# Patient Record
Sex: FEMALE | Race: WHITE
[De-identification: names, ages, dates, MRNs, and addresses within clinical notes are randomized per-mention and may not be internally consistent; named-entity substitution may affect disease eponyms.]

---

## 2018-07-13 ENCOUNTER — HOSPITAL ENCOUNTER (OUTPATIENT)
Dept: HOSPITAL 72 - ULS | Age: 78
Discharge: HOME | End: 2018-07-13
Attending: INTERNAL MEDICINE
Payer: MEDICARE

## 2018-07-13 DIAGNOSIS — I70.0: ICD-10-CM

## 2018-07-13 DIAGNOSIS — K76.89: Primary | ICD-10-CM

## 2018-07-13 PROCEDURE — 76705 ECHO EXAM OF ABDOMEN: CPT

## 2018-07-13 NOTE — DIAGNOSTIC IMAGING REPORT
Indication: Abdominal pain. Persists.

 

Technique: US ABD limited

 

Comparison: 11/3/2010; CT abdomen 7/17/2016

 

Findings: Please note that patient requested limited evaluation as she could not

tolerate the pressure of the probe.

 

Imaged portions of the pancreatic head grossly unremarkable. The body and tail are

not seen. Liver contour appears smooth. Hepatic echogenicity appears within normal

limits and homogeneous. Imaged hepatic veins appear patent. Liver is normal in size.

Partially visualized is grossly unremarkable. In the inferior right hepatic lobe

there is a 1.8 x 2.2 x 2.1 cm well-circumscribed anechoic structure consistent with

the cyst noted in this region on prior CT. No additional hepatic lesion is

identified. Abdominal aorta is normal in caliber with some scattered atherosclerotic

calcifications.

 

IMPRESSION: 

Limited abdominal ultrasound as patient could not tolerate probe pressure. Only

images of the liver, aorta and pancreatic head were obtained.

*  Simple appearing cyst in the inferior right hepatic lobe measuring up to 2.6 cm in

diameter. This corresponds with the cyst noted in this region on prior CT.

*  Atherosclerotic disease noted in the aorta. Visualized portions of the abdominal

aorta normal in caliber.

## 2019-01-29 ENCOUNTER — HOSPITAL ENCOUNTER (EMERGENCY)
Dept: HOSPITAL 72 - EMR | Age: 79
Discharge: HOME | End: 2019-01-29
Payer: MEDICARE

## 2019-01-29 VITALS — DIASTOLIC BLOOD PRESSURE: 86 MMHG | SYSTOLIC BLOOD PRESSURE: 152 MMHG

## 2019-01-29 VITALS — DIASTOLIC BLOOD PRESSURE: 85 MMHG | SYSTOLIC BLOOD PRESSURE: 166 MMHG

## 2019-01-29 VITALS — BODY MASS INDEX: 21.71 KG/M2 | WEIGHT: 118 LBS | HEIGHT: 62 IN

## 2019-01-29 VITALS — SYSTOLIC BLOOD PRESSURE: 192 MMHG | DIASTOLIC BLOOD PRESSURE: 94 MMHG

## 2019-01-29 VITALS — SYSTOLIC BLOOD PRESSURE: 145 MMHG | DIASTOLIC BLOOD PRESSURE: 80 MMHG

## 2019-01-29 DIAGNOSIS — D25.9: Primary | ICD-10-CM

## 2019-01-29 DIAGNOSIS — Z88.8: ICD-10-CM

## 2019-01-29 DIAGNOSIS — I10: ICD-10-CM

## 2019-01-29 LAB
ADD MANUAL DIFF: NO
ALBUMIN SERPL-MCNC: 3.7 G/DL (ref 3.4–5)
ALBUMIN/GLOB SERPL: 1 {RATIO} (ref 1–2.7)
ALP SERPL-CCNC: 113 U/L (ref 46–116)
ALT SERPL-CCNC: 20 U/L (ref 12–78)
ANION GAP SERPL CALC-SCNC: 9 MMOL/L (ref 5–15)
APPEARANCE UR: CLEAR
APTT BLD: 29 SEC (ref 23–33)
APTT PPP: (no result) S
AST SERPL-CCNC: 16 U/L (ref 15–37)
BASOPHILS NFR BLD AUTO: 0.9 % (ref 0–2)
BILIRUB SERPL-MCNC: 0.4 MG/DL (ref 0.2–1)
BUN SERPL-MCNC: 13 MG/DL (ref 7–18)
CALCIUM SERPL-MCNC: 9.2 MG/DL (ref 8.5–10.1)
CHLORIDE SERPL-SCNC: 100 MMOL/L (ref 98–107)
CO2 SERPL-SCNC: 23 MMOL/L (ref 21–32)
CREAT SERPL-MCNC: 0.6 MG/DL (ref 0.55–1.3)
EOSINOPHIL NFR BLD AUTO: 0.7 % (ref 0–3)
ERYTHROCYTE [DISTWIDTH] IN BLOOD BY AUTOMATED COUNT: 13.6 % (ref 11.6–14.8)
GLOBULIN SER-MCNC: 3.7 G/DL
GLUCOSE UR STRIP-MCNC: NEGATIVE MG/DL
HCT VFR BLD CALC: 33.7 % (ref 37–47)
HGB BLD-MCNC: 11 G/DL (ref 12–16)
INR PPP: 0.9 (ref 0.9–1.1)
KETONES UR QL STRIP: NEGATIVE
LEUKOCYTE ESTERASE UR QL STRIP: (no result)
LYMPHOCYTES NFR BLD AUTO: 30.9 % (ref 20–45)
MCV RBC AUTO: 87 FL (ref 80–99)
MONOCYTES NFR BLD AUTO: 10.5 % (ref 1–10)
NEUTROPHILS NFR BLD AUTO: 56.9 % (ref 45–75)
NITRITE UR QL STRIP: NEGATIVE
PH UR STRIP: 8 [PH] (ref 4.5–8)
PLATELET # BLD: 225 K/UL (ref 150–450)
POTASSIUM SERPL-SCNC: 4 MMOL/L (ref 3.5–5.1)
PROT UR QL STRIP: NEGATIVE
RBC # BLD AUTO: 3.89 M/UL (ref 4.2–5.4)
SODIUM SERPL-SCNC: 132 MMOL/L (ref 136–145)
SP GR UR STRIP: 1.01 (ref 1–1.03)
UROBILINOGEN UR-MCNC: NORMAL MG/DL (ref 0–1)
WBC # BLD AUTO: 9.8 K/UL (ref 4.8–10.8)

## 2019-01-29 PROCEDURE — 85025 COMPLETE CBC W/AUTO DIFF WBC: CPT

## 2019-01-29 PROCEDURE — 85730 THROMBOPLASTIN TIME PARTIAL: CPT

## 2019-01-29 PROCEDURE — 81003 URINALYSIS AUTO W/O SCOPE: CPT

## 2019-01-29 PROCEDURE — 83690 ASSAY OF LIPASE: CPT

## 2019-01-29 PROCEDURE — 76856 US EXAM PELVIC COMPLETE: CPT

## 2019-01-29 PROCEDURE — 99284 EMERGENCY DEPT VISIT MOD MDM: CPT

## 2019-01-29 PROCEDURE — 80053 COMPREHEN METABOLIC PANEL: CPT

## 2019-01-29 PROCEDURE — 85610 PROTHROMBIN TIME: CPT

## 2019-01-29 PROCEDURE — 36415 COLL VENOUS BLD VENIPUNCTURE: CPT

## 2019-01-29 NOTE — EMERGENCY ROOM REPORT
History of Present Illness


General


Chief Complaint:  Female Urogenital Problems


Source:  Patient





Present Illness


HPI


Patient is a 78-year-old female presented after increased vaginal bleeding.  

Patient reportedly had been seen by her OB/GYN earlier in the day.  She reports 

having worsening bleeding.  She denies feeling dizzy or lightheaded.  She 

reports having been postmenopausal.  She denies any bloody stools.  She is not 

currently taking any blood thinners.


Allergies:  


Coded Allergies:  


     PAROXETINE (Verified  Allergy, Unknown, 7/17/16)





Patient History


Past Medical History:  see triage record


Reviewed Nursing Documentation:  PMH: Agreed; PSxH: Agreed





Nursing Documentation-PMH


Past Medical History:  No History, Except For


Hx Cardiac Problems:  No - s/p Tonsillectomy


Hx Hypertension:  Yes


Hx Pacemaker:  No


Hx Asthma:  No


Hx COPD:  No


Hx Diabetes:  No


Hx Cancer:  No


Hx Gastrointestinal Problems:  No - s/p Appendectomy


Hx Dialysis:  No


Hx Neurological Problems:  No


Hx Cerebrovascular Accident:  No


Hx Seizures:  No





Review of Systems


All Other Systems:  negative except mentioned in HPI





Physical Exam





Vital Signs








  Date Time  Temp Pulse Resp B/P (MAP) Pulse Ox O2 Delivery O2 Flow Rate FiO2


 


1/29/19 17:46 98.4 106 16 194/96 95 Room Air  








Sp02 EP Interpretation:  reviewed, normal


General Appearance:  normal inspection, well appearing, no apparent distress, 

alert, GCS 15, non-toxic


Head:  atraumatic


ENT:  normal ENT inspection, hearing grossly normal, normal voice


Neck:  normal inspection, full range of motion, supple, no bony tend


Respiratory:  normal inspection, lungs clear, normal breath sounds, no 

respiratory distress, no retraction, no wheezing


Cardiovascular #1:  regular rate, rhythm, no edema


Gastrointestinal:  normal inspection, normal bowel sounds, non tender, soft, no 

guarding, no hernia


Genitourinary:  no CVA tenderness


Musculoskeletal:  normal inspection, back normal, normal range of motion


Neurologic:  normal inspection, alert, responsive, speech normal


Psychiatric:  normal inspection, judgement/insight normal, mood/affect normal


Skin:  normal inspection, normal color, no rash





Medical Decision Making


Diagnostic Impression:  


 Primary Impression:  


 Fibroid uterus


ER Course


Patient presented for vaginal bleeding.  Differential diagnosis included wasn't 

limited to fibroid uterus, uterine cancer, menorrhagia, coagulopathy,  among 

others.  Because of complexity of patient's case laboratory testing and imaging 

studies were ordered.  Laboratory testing was notable for minimal anemia.  

Patient states is normal for her.  Patient was advised to follow-up with her 

primary care physician for reexamination and treatment.





Labs








Test


  1/29/19


18:10


 


White Blood Count


  9.8 K/UL


(4.8-10.8)


 


Red Blood Count


  3.89 M/UL


(4.20-5.40)


 


Hemoglobin


  11.0 G/DL


(12.0-16.0)


 


Hematocrit


  33.7 %


(37.0-47.0)


 


Mean Corpuscular Volume 87 FL (80-99) 


 


Mean Corpuscular Hemoglobin


  28.4 PG


(27.0-31.0)


 


Mean Corpuscular Hemoglobin


Concent 32.8 G/DL


(32.0-36.0)


 


Red Cell Distribution Width


  13.6 %


(11.6-14.8)


 


Platelet Count


  225 K/UL


(150-450)


 


Mean Platelet Volume


  7.7 FL


(6.5-10.1)


 


Neutrophils (%) (Auto)


  56.9 %


(45.0-75.0)


 


Lymphocytes (%) (Auto)


  30.9 %


(20.0-45.0)


 


Monocytes (%) (Auto)


  10.5 %


(1.0-10.0)


 


Eosinophils (%) (Auto)


  0.7 %


(0.0-3.0)


 


Basophils (%) (Auto)


  0.9 %


(0.0-2.0)


 


Prothrombin Time


  9.9 SEC


(9.30-11.50)


 


Prothromb Time International


Ratio 0.9 (0.9-1.1) 


 


 


Activated Partial


Thromboplast Time 29 SEC (23-33) 


 


 


Urine Color Pale yellow 


 


Urine Appearance Clear 


 


Urine pH 8 (4.5-8.0) 


 


Urine Specific Gravity


  1.010


(1.005-1.035)


 


Urine Protein


  Negative


(NEGATIVE)


 


Urine Glucose (UA)


  Negative


(NEGATIVE)


 


Urine Ketones


  Negative


(NEGATIVE)


 


Urine Blood 5+ (NEGATIVE) 


 


Urine Nitrite


  Negative


(NEGATIVE)


 


Urine Bilirubin


  Negative


(NEGATIVE)


 


Urine Urobilinogen


  Normal MG/DL


(0.0-1.0)


 


Urine Leukocyte Esterase 1+ (NEGATIVE) 


 


Urine RBC


  2-4 /HPF (0 -


2)


 


Urine WBC


  0-2 /HPF (0 -


2)


 


Urine Squamous Epithelial


Cells Occasional


/LPF


 


Urine Bacteria


  None /HPF


(NONE)


 


Sodium Level


  132 MMOL/L


(136-145)


 


Potassium Level


  4.0 MMOL/L


(3.5-5.1)


 


Chloride Level


  100 MMOL/L


()


 


Carbon Dioxide Level


  23 MMOL/L


(21-32)


 


Anion Gap


  9 mmol/L


(5-15)


 


Blood Urea Nitrogen


  13 mg/dL


(7-18)


 


Creatinine


  0.6 MG/DL


(0.55-1.30)


 


Estimat Glomerular Filtration


Rate  mL/min (>60) 


 


 


Glucose Level


  107 MG/DL


()


 


Calcium Level


  9.2 MG/DL


(8.5-10.1)


 


Total Bilirubin


  0.4 MG/DL


(0.2-1.0)


 


Aspartate Amino Transf


(AST/SGOT) 16 U/L (15-37) 


 


 


Alanine Aminotransferase


(ALT/SGPT) 20 U/L (12-78) 


 


 


Alkaline Phosphatase


  113 U/L


()


 


Total Protein


  7.4 G/DL


(6.4-8.2)


 


Albumin


  3.7 G/DL


(3.4-5.0)


 


Globulin 3.7 g/dL 


 


Albumin/Globulin Ratio 1.0 (1.0-2.7) 


 


Lipase


  217 U/L


()











Last Vital Signs








  Date Time  Temp Pulse Resp B/P (MAP) Pulse Ox O2 Delivery O2 Flow Rate FiO2


 


1/29/19 21:19 98.3 69 19 152/86 99 Room Air  








Status:  improved


Disposition:  HOME, SELF-CARE


Condition:  Stable


Referrals:  


NON PHYSICIAN (PCP)


Patient Instructions:  Uterine Fibroids











Wesly Quick MD Jan 29, 2019 22:39

## 2019-01-29 NOTE — NUR
ED Nurse Note:



Pt came into the ER w/ complaints of vaginal bleeding x 2 days. Denies any 
pain. A + O x4 Ambulatory. Skin warm to touch.

## 2019-01-29 NOTE — NUR
-------------------------------------------------------------------------------

           *** Note terrence in EDM - 01/29/19 at 2256 by ISIDRO ***            

-------------------------------------------------------------------------------

ED Nurse NOTES:

PT IS D/C PER ERMD ORDER. PT WAS GIVEN DISCHARGE INSTRUCTIONS PROVIDED. PT IS 
AOX4, AND WAS ABLE TO VERBALIZE UNDERSTANDING, ID BAND REMOVED . PT VSS. ABLE 
TO AMBULATE WITH STEADY GAIT. PT TOOK ALL BELONGINGS WHEN LEAVING ED.

## 2019-01-29 NOTE — NUR
ED Nurse Note:



Collected blood and urine. Pt does not want IV site. Pt states that she can be 
poked again if need be.

## 2019-01-29 NOTE — NUR
ED Nurse Note:



report received from howard penaloza. pt is in bed resting, vital signs stable, pt 
is not in distress denies pain. will await futher orders from harshad

## 2019-01-30 NOTE — DIAGNOSTIC IMAGING REPORT
Indication:Lower abdominal and pelvic pain

 

Technique: Grayscale and duplex Doppler imaging of the pelvis performed utilizing a

transabdominal and endovaginal scan.

 

Comparison: None

 

Findings:

 

Uterus measures 10 x 7 x 9 cm. Uterine echogenicity is normal. There is a fibroid in

the lower uterine segment measuring approximately 4 to 5 cm. There is also a fibroid

in the fundal region measuring 3 cm. Endometrium is not well seen. There is no free

fluid. There is no adnexal mass identified. There is dopplerable blood flow within

both ovaries which appear normal. A few follicles noted.

 

IMPRESSION:

 

No acute findings. Uterine fibroids

## 2019-02-06 ENCOUNTER — HOSPITAL ENCOUNTER (EMERGENCY)
Dept: HOSPITAL 72 - EMR | Age: 79
Discharge: LEFT BEFORE DISCHARGE | End: 2019-02-06
Payer: MEDICAID

## 2019-02-06 VITALS — HEIGHT: 60 IN | BODY MASS INDEX: 23.56 KG/M2 | WEIGHT: 120 LBS

## 2019-02-06 DIAGNOSIS — N93.9: Primary | ICD-10-CM

## 2019-02-06 DIAGNOSIS — Z90.49: ICD-10-CM

## 2019-02-06 DIAGNOSIS — I10: ICD-10-CM

## 2019-02-06 NOTE — EMERGENCY ROOM REPORT
History of Present Illness


General


Source:  Patient, Medical Record, EMS





Present Illness


HPI


Is a 79-year-old female who was here a few days ago.  She presents with chief 

complaint of vaginal bleeding.  She had blood work done and ultrasound which 

show she has a fibroid uterus.  No other mass seen.  She said she still having 

vaginal bleeding.  No change from before.  No syncope.  No nausea no vomiting.  

Nothing made it better.  Nothing made it worse.  She scheduled to see her OB/

GYN on Thursday.  This is only day and a half from now. 





When I asked the patient why she is here, she said because she still bleeding 

and she was to see the OB/GYN sooner and to see if she can get another 

ultrasound.  Explained to the patient that would not happen tonight.  I told 

patient that ultrasound would not show anything new.  She just had one less 

than a week ago.  There is no emergent issue that require her to see OB/GYN 

tonight.





She was not happy with my explanation and ask the paramedic to take her home.  

The paramedic tell her that they do not take patient home.  She said she will 

walk home since she is only live a couple blocks from here.  She walked out 

without getting any triage.


Allergies:  


Coded Allergies:  


     PAROXETINE (Verified  Allergy, Unknown, 7/17/16)





Patient History


Past Medical History:  see triage record, old chart reviewed


Past Surgical History:  other


Pertinent Family History:  none


Social History:  Denies: smoking


Pregnant Now:  No


Immunizations:  other


Reviewed Nursing Documentation:  PMH: Agreed; PSxH: Agreed





Nursing Documentation-PMH


Hx Cardiac Problems:  No - s/p Tonsillectomy


Hx Hypertension:  Yes


Hx Pacemaker:  No


Hx Asthma:  No


Hx COPD:  No


Hx Diabetes:  No


Hx Cancer:  No


Hx Gastrointestinal Problems:  No - s/p Appendectomy


Hx Dialysis:  No


Hx Neurological Problems:  No


Hx Cerebrovascular Accident:  No


Hx Seizures:  No





Review of Systems


Eye:  Denies: eye pain, blurred vision


ENT:  Denies: ear pain, nose congestion, throat swelling


Respiratory:  Denies: cough, shortness of breath


Cardiovascular:  Denies: chest pain, palpitations


Gastrointestinal:  Denies: abdominal pain, diarrhea, nausea, vomiting


Genitourinary:  Reports: vag bleed/dc


Musculoskeletal:  Denies: back pain, joint pain


Skin:  Denies: rash


Neurological:  Denies: headache, numbness


Endocrine:  Denies: increased thirst, increased urine


Hematologic/Lymphatic:  Denies: easy bruising


All Other Systems:  negative except mentioned in HPI





Physical Exam


General Appearance:  normal inspection, well appearing, no apparent distress


Eyes:  bilateral eye PERRL


ENT:  hearing grossly normal, normal voice


Neck:  full range of motion


Respiratory:  no respiratory distress, no retraction


Cardiovascular #1:  other - No edema


Gastrointestinal:  non-distended


Musculoskeletal:  back normal


Neurologic:  alert, oriented x3


Psychiatric:  anxious





Medical Decision Making


Diagnostic Impression:  


 Primary Impression:  


 Vaginal bleeding


ER Course


Patient presents with vaginal bleeding.  This is chronic in nature.  She had 

recent workup less than a week ago.  I see no need for further workup.  

Explained this to this patient.  She left prior to my complete physical exam.


Status:  improved


Disposition:  ELOPED


Condition:  Stable











Stuart Etienne MD Feb 6, 2019 01:03

## 2019-03-27 ENCOUNTER — HOSPITAL ENCOUNTER (OUTPATIENT)
Dept: HOSPITAL 72 - ULS | Age: 79
Discharge: HOME | End: 2019-03-27
Payer: MEDICARE

## 2019-03-27 DIAGNOSIS — N93.8: Primary | ICD-10-CM

## 2019-03-27 PROCEDURE — 76856 US EXAM PELVIC COMPLETE: CPT

## 2019-03-27 PROCEDURE — 76830 TRANSVAGINAL US NON-OB: CPT

## 2019-03-27 NOTE — DIAGNOSTIC IMAGING REPORT
Indication:History of uterine fibroids. Patient had a recent endometrial biopsy which

per patient was negative for cancer. Presents with vaginal bleeding and pain.

 

Technique: Grayscale and duplex Doppler imaging of the pelvis performed utilizing a

transabdominal and endovaginal scan.

 

Comparison: None

 

Findings:

 

The uterus is retroverted. There is heterogeneous, avascular echogenic material and

fluid within the endometrial canal presumably blood. The uterus measures 7.5 x 5 x

3.7 cm. The endometrium is poorly defined and not well established or visualized on

this examination. The distinction between multiple uterine fibroids and the

endometrial canal is difficult at best on this examination.

 

The left ovary is not seen. The right ovary is normal in appearance measures 1.5 x

1.4 x 1.8 cm.

 

IMPRESSION:

 

Limited evaluation of the uterus.  Marked heterogeneity of the uterus with a central

area of what is likely endometrial hematoma measuring about 3 cm. Suggestion of

uterine fibroids.

 

Nonvisualization of left ovary